# Patient Record
Sex: MALE | Race: WHITE | NOT HISPANIC OR LATINO | Employment: UNEMPLOYED | URBAN - METROPOLITAN AREA
[De-identification: names, ages, dates, MRNs, and addresses within clinical notes are randomized per-mention and may not be internally consistent; named-entity substitution may affect disease eponyms.]

---

## 2020-11-01 ENCOUNTER — TRANSFERRED RECORDS (OUTPATIENT)
Dept: HEALTH INFORMATION MANAGEMENT | Facility: CLINIC | Age: 60
End: 2020-11-01
Payer: COMMERCIAL

## 2020-11-19 ENCOUNTER — TRANSFERRED RECORDS (OUTPATIENT)
Dept: HEALTH INFORMATION MANAGEMENT | Facility: CLINIC | Age: 60
End: 2020-11-19
Payer: COMMERCIAL

## 2020-11-19 LAB
ALT SERPL-CCNC: 19 U/L (ref 9–46)
AST SERPL-CCNC: 18 U/L (ref 10–35)
CREATININE (EXTERNAL): 1.1 MG/DL (ref 0.7–1.25)
GFR ESTIMATED (EXTERNAL): 73 ML/MIN/1.73M2
GFR ESTIMATED (IF AFRICAN AMERICAN) (EXTERNAL): 84 ML/MIN/1.73M2
GLUCOSE (EXTERNAL): 124 MG/DL (ref 65–99)
POTASSIUM (EXTERNAL): 5.2 MMOL/L (ref 3.5–5.3)

## 2021-11-10 ENCOUNTER — OFFICE VISIT (OUTPATIENT)
Dept: FAMILY MEDICINE | Facility: CLINIC | Age: 61
End: 2021-11-10
Payer: COMMERCIAL

## 2021-11-10 VITALS
DIASTOLIC BLOOD PRESSURE: 93 MMHG | OXYGEN SATURATION: 97 % | HEART RATE: 87 BPM | BODY MASS INDEX: 38.73 KG/M2 | WEIGHT: 241 LBS | TEMPERATURE: 98.4 F | HEIGHT: 66 IN | RESPIRATION RATE: 20 BRPM | SYSTOLIC BLOOD PRESSURE: 158 MMHG

## 2021-11-10 DIAGNOSIS — Z72.0 TOBACCO ABUSE: ICD-10-CM

## 2021-11-10 DIAGNOSIS — I10 BENIGN ESSENTIAL HYPERTENSION: ICD-10-CM

## 2021-11-10 DIAGNOSIS — Z00.00 ENCOUNTER FOR MEDICAL EXAMINATION TO ESTABLISH CARE: Primary | ICD-10-CM

## 2021-11-10 DIAGNOSIS — E66.01 MORBID OBESITY (H): ICD-10-CM

## 2021-11-10 DIAGNOSIS — E78.00 HYPERCHOLESTEROLEMIA: ICD-10-CM

## 2021-11-10 DIAGNOSIS — E78.5 HYPERLIPIDEMIA LDL GOAL <100: ICD-10-CM

## 2021-11-10 DIAGNOSIS — F10.10 ALCOHOL ABUSE: ICD-10-CM

## 2021-11-10 LAB
ALBUMIN SERPL-MCNC: 3.6 G/DL (ref 3.5–5)
ALP SERPL-CCNC: 81 U/L (ref 45–120)
ALT SERPL W P-5'-P-CCNC: 32 U/L (ref 0–45)
ANION GAP SERPL CALCULATED.3IONS-SCNC: 12 MMOL/L (ref 5–18)
AST SERPL W P-5'-P-CCNC: 26 U/L (ref 0–40)
BILIRUB SERPL-MCNC: 0.8 MG/DL (ref 0–1)
BUN SERPL-MCNC: 12 MG/DL (ref 8–22)
CALCIUM SERPL-MCNC: 9.4 MG/DL (ref 8.5–10.5)
CHLORIDE BLD-SCNC: 104 MMOL/L (ref 98–107)
CHOLEST SERPL-MCNC: 172 MG/DL
CO2 SERPL-SCNC: 22 MMOL/L (ref 22–31)
CREAT SERPL-MCNC: 1.01 MG/DL (ref 0.7–1.3)
FASTING STATUS PATIENT QL REPORTED: ABNORMAL
GFR SERPL CREATININE-BSD FRML MDRD: 80 ML/MIN/1.73M2
GGT SERPL-CCNC: 67 U/L (ref 0–50)
GLUCOSE BLD-MCNC: 97 MG/DL (ref 70–125)
HDLC SERPL-MCNC: 63 MG/DL
LDLC SERPL CALC-MCNC: 79 MG/DL
POTASSIUM BLD-SCNC: 4.1 MMOL/L (ref 3.5–5)
PROT SERPL-MCNC: 7.4 G/DL (ref 6–8)
SODIUM SERPL-SCNC: 138 MMOL/L (ref 136–145)
TRIGL SERPL-MCNC: 150 MG/DL

## 2021-11-10 PROCEDURE — 80061 LIPID PANEL: CPT | Performed by: STUDENT IN AN ORGANIZED HEALTH CARE EDUCATION/TRAINING PROGRAM

## 2021-11-10 PROCEDURE — 82977 ASSAY OF GGT: CPT | Performed by: STUDENT IN AN ORGANIZED HEALTH CARE EDUCATION/TRAINING PROGRAM

## 2021-11-10 PROCEDURE — 36415 COLL VENOUS BLD VENIPUNCTURE: CPT | Performed by: STUDENT IN AN ORGANIZED HEALTH CARE EDUCATION/TRAINING PROGRAM

## 2021-11-10 PROCEDURE — 99204 OFFICE O/P NEW MOD 45 MIN: CPT | Mod: 25 | Performed by: STUDENT IN AN ORGANIZED HEALTH CARE EDUCATION/TRAINING PROGRAM

## 2021-11-10 PROCEDURE — 0004A COVID-19,PF,PFIZER (12+ YRS): CPT | Performed by: STUDENT IN AN ORGANIZED HEALTH CARE EDUCATION/TRAINING PROGRAM

## 2021-11-10 PROCEDURE — 91300 COVID-19,PF,PFIZER (12+ YRS): CPT | Performed by: STUDENT IN AN ORGANIZED HEALTH CARE EDUCATION/TRAINING PROGRAM

## 2021-11-10 PROCEDURE — 80053 COMPREHEN METABOLIC PANEL: CPT | Performed by: STUDENT IN AN ORGANIZED HEALTH CARE EDUCATION/TRAINING PROGRAM

## 2021-11-10 RX ORDER — LISINOPRIL 20 MG/1
20 TABLET ORAL DAILY
Qty: 90 TABLET | Refills: 0 | Status: SHIPPED | OUTPATIENT
Start: 2021-11-10 | End: 2021-11-10

## 2021-11-10 RX ORDER — ATORVASTATIN CALCIUM 10 MG/1
10 TABLET, FILM COATED ORAL DAILY
Qty: 90 TABLET | Refills: 3 | Status: SHIPPED | OUTPATIENT
Start: 2021-11-10 | End: 2022-11-17

## 2021-11-10 RX ORDER — LISINOPRIL 20 MG/1
20 TABLET ORAL DAILY
Qty: 90 TABLET | Refills: 3 | Status: SHIPPED | OUTPATIENT
Start: 2021-11-10 | End: 2022-11-17

## 2021-11-10 RX ORDER — ATORVASTATIN CALCIUM 10 MG/1
10 TABLET, FILM COATED ORAL DAILY
Qty: 90 TABLET | Refills: 0 | Status: SHIPPED | OUTPATIENT
Start: 2021-11-10 | End: 2021-11-10

## 2021-11-10 ASSESSMENT — MIFFLIN-ST. JEOR: SCORE: 1840.92

## 2021-11-10 NOTE — LETTER
November 12, 2021      Gregory PICKARD Gregory  [address]   [place] [address]        Dear ,    We are writing to inform you of your test results.    Enclosed are the labs we recently performed.  Your kidney and liver function appear relatively normal.  The GGT is a more sensitive test to see if there is any stress on the liver and that was positive, suggesting that alcohol consumption is causing some irritation to your liver and prolonged periods of this can lead to significant liver disease.  Your lipid levels are a little higher than we would like (we don't really pay attention to the triglycerides as you were not fasting and those can be a little elevated if you recently ate).  Your numbers are good and you expressed not wanting to adjust your statin at this time (though you could consider doing this in the future).       Please let us know if there is anything else we could do for you.  As we discussed, we would recommend things like a colonoscopy or CT scan of the lungs to rule out lung cancer.  If you change your mind and would like to schedule these please let us know.       Have a good day,   Estrada Navas MD on 11/12/2021       Resulted Orders   Comprehensive metabolic panel   Result Value Ref Range    Sodium 138 136 - 145 mmol/L    Potassium 4.1 3.5 - 5.0 mmol/L    Chloride 104 98 - 107 mmol/L    Carbon Dioxide (CO2) 22 22 - 31 mmol/L    Anion Gap 12 5 - 18 mmol/L    Urea Nitrogen 12 8 - 22 mg/dL    Creatinine 1.01 0.70 - 1.30 mg/dL    Calcium 9.4 8.5 - 10.5 mg/dL    Glucose 97 70 - 125 mg/dL    Alkaline Phosphatase 81 45 - 120 U/L    AST 26 0 - 40 U/L    ALT 32 0 - 45 U/L    Protein Total 7.4 6.0 - 8.0 g/dL    Albumin 3.6 3.5 - 5.0 g/dL    Bilirubin Total 0.8 0.0 - 1.0 mg/dL    GFR Estimate 80 >60 mL/min/1.73m2      Comment:      As of July 11, 2021, eGFR is calculated by the CKD-EPI creatinine equation, without race adjustment. eGFR can be influenced by muscle mass, exercise, and diet.  The reported eGFR is an estimation only and is only applicable if the renal function is stable.   Lipid Profile   Result Value Ref Range    Cholesterol 172 <=199 mg/dL    Triglycerides 150 (H) <=149 mg/dL    Direct Measure HDL 63 >=40 mg/dL      Comment:      HDL Cholesterol Reference Range:     0-2 years:   No reference ranges established for patients under 2 years old  at [facility] for lipid analytes.    2-8 years:  Greater than 45 mg/dL     18 years and older:   Female: Greater than or equal to 50 mg/dL   Male:   Greater than or equal to 40 mg/dL    LDL Cholesterol Calculated 79 <=129 mg/dL    Patient Fasting > 8hrs? Unknown    GGT   Result Value Ref Range    GGT 67 (H) 0 - 50 U/L       If you have any questions or concerns, please call the clinic at the number listed above.       Sincerely,      Estrada Navas MD

## 2021-11-10 NOTE — PROGRESS NOTES
CHIEF COMPLAINT                                                      Chief Complaint   Patient presents with     [facility] Care     [facility] prior to care here     Medication Reconciliation     Needs attention, brought medication list     SUBJECTIVE:                                                    Gregory Jenkins is a 60 year old year old male who presents to clinic today for the following health issues:    Establish care:  -Patient is switching to our clinic from [facility]  -Patient felt like he never saw the same provider there and that they took too long to be seen  -Today is looking for refills on his home medications as he is about to run out  -Reports a history of some alcohol abuse and continues to drink to the point of blacking out at least twice weekly   -States that he has been feeling at his baseline without any acute concerns today and he is not interested in adjusting medications   -Family history of prostate cancer, grandfather  from it but he denies any acute issues with this (increased urinary frequency, inability to empty bladder, struggles maintaining a stream, etc)  -Uses a pouch of snooze ten times a day, was using cigarettes prior to this for about forty years  -Twice weekly will drink 12-14 drinks at a time to the point of blacking out  -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    ----------------------------------------------------------------------------------------------------------------------  Patient Active Problem List   Diagnosis     Hypercholesterolemia     Morbid obesity (H)     Tobacco abuse     Alcohol abuse     Benign essential hypertension     Past Surgical History:   Procedure Laterality Date     TONSILLECTOMY         Social History     Tobacco Use     Smoking status: Not on file     Smokeless tobacco: Not on file   Substance Use Topics     Alcohol use: Yes     Comment: 2 times per week      "Family History   Problem Relation Age of Onset     Prostate Cancer Father           Problem list and past medical, surgical, social, and family histories reviewed & adjusted, as indicated.    Current Outpatient Medications   Medication Sig Dispense Refill     atorvastatin (LIPITOR) 10 MG tablet Take 1 tablet (10 mg) by mouth daily 90 tablet 3     lisinopril (ZESTRIL) 20 MG tablet Take 1 tablet (20 mg) by mouth daily 90 tablet 3     Medication list reviewed and updated as indicated.    No Known Allergies  Allergies reviewed and updated as indicated.  ----------------------------------------------------------------------------------------------------------------------  ROS:  Constitutional, HEENT, cardiovascular, pulmonary, GI, musculoskeletal, neuro, skin, and psych systems are negative, except as otherwise noted.    OBJECTIVE:     BP (!) 158/93   Pulse 87   Temp 98.4  F (36.9  C) (Oral)   Resp 20   Ht 1.676 m (5' 6\")   Wt 109.3 kg (241 lb)   SpO2 97%   BMI 38.90 kg/m    Body mass index is 38.9 kg/m .  Exam:  Constitutional: Elderly gentleman sitting upright in his chair, no acute discomfort noted, elevated BMI  Head: Normocephalic. No masses, lesions, tenderness or abnormalities  Neck: Neck supple. No adenopathy. Thyroid symmetric, normal size  ENT: ENT exam normal, no neck nodes or sinus tenderness  Cardiovascular: negative, PMI normal. No lifts, heaves, or thrills. RRR. No murmurs, clicks gallops or rub  Respiratory: negative, Percussion normal. Good diaphragmatic excursion. Lungs clear  Gastrointestinal: Abdomen soft, non-tender. BS normal. No masses, organomegaly  : Deferred  Musculoskeletal: extremities normal- no gross deformities noted, gait normal and normal muscle tone  Skin: no suspicious lesions or rashes  Neurologic: Gait normal. Reflexes normal and symmetric. Sensation grossly WNL.  Psychiatric: mentation appears normal and affect normal/bright  Hematologic/Lymphatic/Immunologic: Normal " cervical lymph nodes    Diagnostic Test Results:      ASSESSMENT/PLAN:   (Z00.00) Encounter for medical examination to establish care  (primary encounter diagnosis)  (I10) Benign essential hypertension  (E78.00) Hypercholesterolemia  (E78.5) Hyperlipidemia LDL goal <100  (F10.10) Alcohol abuse  (Z72.0) Tobacco abuse  (E66.01) Morbid obesity (H)  -Patient presents to the clinic to establish care  -Has a history of hypertension, currently on lisinopril 20 mg  -BP is not well controlled today but he does not want to change his medications at all at this time  -Will discuss at further visits   -Patient has a history of hyperlipidemia, is currently on atorvastatin 10 mg  -We will check his lipid profile today and will recheck kidney function in the setting of lisinopril use  -Will make BMP CMP given frequent alcohol use, will also check GTT to see if there are early signs of liver damage secondary to drinking alcohol  -Counseled on healthy habits  -Recommended lung cancer screening and colonoscopy, but the patient declines investigation at this time  -Recommended following up for annual exam at appropriate times  -Received COVID vaccine for today     Medications Discontinued During This Encounter   Medication Reason     atorvastatin (LIPITOR) 10 MG tablet      lisinopril (ZESTRIL) 20 MG tablet      Options for treatment and follow-up care were reviewed with the patient and/or guardian. Gregory Jenkins and/or guardian engaged in the decision making process and verbalized understanding of the options discussed and agreed with the final plan    Precepted with Dr. Ruiz.    Estrada Navas MD on 11/10/2021 at 3:13 PM

## 2021-11-10 NOTE — PROGRESS NOTES
Preceptor Attestation:   Patient seen, evaluated and discussed with the resident. I have verified the content of the note, which accurately reflects my assessment of the patient and the plan of care.  Supervising Physician:Estrada Ruiz MD  [facility]

## 2021-11-17 PROBLEM — I10 BENIGN ESSENTIAL HYPERTENSION: Status: ACTIVE | Noted: 2021-11-17

## 2021-11-17 PROBLEM — E78.00 HYPERCHOLESTEROLEMIA: Status: ACTIVE | Noted: 2021-11-17

## 2021-11-17 PROBLEM — F10.10 ALCOHOL ABUSE: Status: ACTIVE | Noted: 2021-11-17

## 2021-11-17 PROBLEM — Z72.0 TOBACCO ABUSE: Status: ACTIVE | Noted: 2021-11-17

## 2021-11-17 PROBLEM — E66.01 MORBID OBESITY (H): Status: ACTIVE | Noted: 2021-11-17

## 2022-10-12 ENCOUNTER — ALLIED HEALTH/NURSE VISIT (OUTPATIENT)
Dept: FAMILY MEDICINE | Facility: CLINIC | Age: 62
End: 2022-10-12
Payer: COMMERCIAL

## 2022-10-12 VITALS — DIASTOLIC BLOOD PRESSURE: 85 MMHG | HEART RATE: 96 BPM | SYSTOLIC BLOOD PRESSURE: 129 MMHG | OXYGEN SATURATION: 96 %

## 2022-10-12 DIAGNOSIS — Z23 ENCOUNTER FOR IMMUNIZATION: Primary | ICD-10-CM

## 2022-10-12 PROCEDURE — 99207 PR NO CHARGE NURSE ONLY: CPT

## 2022-10-12 PROCEDURE — 91312 COVID-19,PF,PFIZER BOOSTER BIVALENT: CPT

## 2022-10-12 PROCEDURE — 90682 RIV4 VACC RECOMBINANT DNA IM: CPT

## 2022-10-12 PROCEDURE — 90471 IMMUNIZATION ADMIN: CPT

## 2022-10-12 PROCEDURE — 0124A COVID-19,PF,PFIZER BOOSTER BIVALENT: CPT

## 2022-11-17 ENCOUNTER — OFFICE VISIT (OUTPATIENT)
Dept: FAMILY MEDICINE | Facility: CLINIC | Age: 62
End: 2022-11-17
Payer: COMMERCIAL

## 2022-11-17 VITALS
HEIGHT: 65 IN | TEMPERATURE: 98.7 F | OXYGEN SATURATION: 98 % | WEIGHT: 235 LBS | BODY MASS INDEX: 39.15 KG/M2 | SYSTOLIC BLOOD PRESSURE: 147 MMHG | RESPIRATION RATE: 20 BRPM | DIASTOLIC BLOOD PRESSURE: 89 MMHG

## 2022-11-17 DIAGNOSIS — E78.5 HYPERLIPIDEMIA LDL GOAL <100: ICD-10-CM

## 2022-11-17 DIAGNOSIS — Z72.0 TOBACCO USE: ICD-10-CM

## 2022-11-17 DIAGNOSIS — Z23 NEED FOR TDAP VACCINATION: ICD-10-CM

## 2022-11-17 DIAGNOSIS — I10 BENIGN ESSENTIAL HYPERTENSION: Primary | ICD-10-CM

## 2022-11-17 LAB
ANION GAP SERPL CALCULATED.3IONS-SCNC: 15 MMOL/L (ref 7–15)
BUN SERPL-MCNC: 9.1 MG/DL (ref 8–23)
CALCIUM SERPL-MCNC: 8.7 MG/DL (ref 8.8–10.2)
CHLORIDE SERPL-SCNC: 119 MMOL/L (ref 98–107)
CREAT SERPL-MCNC: 1.1 MG/DL (ref 0.67–1.17)
DEPRECATED HCO3 PLAS-SCNC: 19 MMOL/L (ref 22–29)
GFR SERPL CREATININE-BSD FRML MDRD: 76 ML/MIN/1.73M2
GLUCOSE SERPL-MCNC: 99 MG/DL (ref 70–99)
POTASSIUM SERPL-SCNC: 5 MMOL/L (ref 3.4–5.3)
SODIUM SERPL-SCNC: 153 MMOL/L (ref 136–145)

## 2022-11-17 PROCEDURE — 36415 COLL VENOUS BLD VENIPUNCTURE: CPT | Performed by: STUDENT IN AN ORGANIZED HEALTH CARE EDUCATION/TRAINING PROGRAM

## 2022-11-17 PROCEDURE — 80048 BASIC METABOLIC PNL TOTAL CA: CPT | Performed by: STUDENT IN AN ORGANIZED HEALTH CARE EDUCATION/TRAINING PROGRAM

## 2022-11-17 PROCEDURE — 90715 TDAP VACCINE 7 YRS/> IM: CPT | Performed by: STUDENT IN AN ORGANIZED HEALTH CARE EDUCATION/TRAINING PROGRAM

## 2022-11-17 PROCEDURE — 90471 IMMUNIZATION ADMIN: CPT | Performed by: STUDENT IN AN ORGANIZED HEALTH CARE EDUCATION/TRAINING PROGRAM

## 2022-11-17 PROCEDURE — 99214 OFFICE O/P EST MOD 30 MIN: CPT | Mod: 25 | Performed by: STUDENT IN AN ORGANIZED HEALTH CARE EDUCATION/TRAINING PROGRAM

## 2022-11-17 RX ORDER — BNT162B2 0.23 MG/2.25ML
INJECTION, SUSPENSION INTRAMUSCULAR
COMMUNITY
Start: 2022-05-01

## 2022-11-17 RX ORDER — ATORVASTATIN CALCIUM 10 MG/1
10 TABLET, FILM COATED ORAL DAILY
Qty: 90 TABLET | Refills: 3 | Status: SHIPPED | OUTPATIENT
Start: 2022-11-17 | End: 2023-11-13

## 2022-11-17 RX ORDER — LISINOPRIL 20 MG/1
20 TABLET ORAL DAILY
Qty: 90 TABLET | Refills: 3 | Status: SHIPPED | OUTPATIENT
Start: 2022-11-17 | End: 2023-11-13

## 2022-11-17 NOTE — PROGRESS NOTES
Preceptor Attestation:   Patient seen, evaluated and discussed with the resident. I have verified the content of the note, which accurately reflects my assessment of the patient and the plan of care.    Supervising Physician:Sandy Shelton MD    [facility]

## 2022-11-17 NOTE — PROGRESS NOTES
Assessment and Plan     (I10) Benign essential hypertension  (primary encounter diagnosis)  Comment: Initial /96 today. Not checking at home. Last visit a month ago had normal BP. Doesn't want to change BP med right now regardless. Will repeat BP before leaving clinic today.  Plan:   -BMP  -lisinopril (ZESTRIL) 20 MG tablet daily, refilled  -Recommended checking BP daily  -Recommended follow-up, patient will schedule on own he says    (E78.5) Hyperlipidemia LDL goal <100  Comment: Been on lipitor 10mg daily. Last lipid check showed LDL <90 and HDL >45. Doesn't want lipids rechecked today.  Plan:   -atorvastatin (LIPITOR) 10 MG tablet daily, refilled  -Recommended repeat lipids, patient declines    (Z23) Need for Tdap vaccination  Comment: No h/o tdap per [facility] and our records. Willing to get updated today.  Plan:   -TDAP VACCINE (Adacel, Boostrix)  [[id number]]    (Z72.0) Tobacco use  Comment: Using chewing tobacco, doesn't want to quit. Not smoking.  Plan:  -Counseled, patient doesn't want to quit        Options for treatment and follow-up care were reviewed with the patient and/or guardian. Gregory Jenkins and/or guardian engaged in the decision making process and verbalized understanding of the options discussed and agreed with the final plan.    Renny Tanner MD      Precepted today with: Dr Shelton           HPI:       Gregory Jenkins is a 61 year old  male with pertinent hx of HTN, chewing tobacco use, HLD who presents for medication refill:    On lisinopril and lipitor.  Not checking BP at home.  Needs refills.  Denies any complaints.  Not interested in changing meds right now.  Still using chewing tobacco.         PMHX:     Patient Active Problem List   Diagnosis     Hypercholesterolemia     Morbid obesity (H)     Tobacco abuse     Alcohol abuse     Benign essential hypertension       Current Outpatient Medications   Medication Sig Dispense Refill     atorvastatin (LIPITOR) 10 MG tablet Take 1 tablet (10 mg) by  "mouth daily 90 tablet 3     lisinopril (ZESTRIL) 20 MG tablet Take 1 tablet (20 mg) by mouth daily 90 tablet 3     PFIZER-BIONT COVID-19 VAC-EVANGELINA 30 MCG/0.3ML injection          Social History     Tobacco Use     Smoking status: Former     Packs/day: 1.00     Years: 40.00     Pack years: 40.00     Types: Cigarettes     Start date: 1/1/1972     Quit date: 1/1/2012     Years since quitting: 10.8     Smokeless tobacco: Current     Types: Snuff   Substance Use Topics     Alcohol use: Yes     Comment: 2 times per week     Drug use: Not Currently        No Known Allergies    No results found for this or any previous visit (from the past 24 hour(s)).         Review of Systems:     10 point ROS negative except for what is noted in HPI          Physical Exam:     Vitals:    11/17/22 0915   BP: (!) 152/96   Resp: 20   Temp: 98.7  F (37.1  C)   TempSrc: Oral   SpO2: 98%   Weight: 106.6 kg (235 lb)   Height: 1.64 m (5' 4.57\")     Body mass index is 39.63 kg/m .    General: Sitting comfortably. No acute distress.   HEENT: Conjunctivae are clear, nonicteric. EOMI.  Neck: No masses appreciated.  Respiratory: No respiratory distress.   Cardiac: Warm and well perfused. Brisk cap refill.  Abdominal: Abdomen is soft and non-tender without distention.  Extremities: Upper and lower extremities grossly normal.  Skin: Skin in warm without rashes.  Neurological: Motor function is grossly normal.   Psychiatric: Good insight.     "

## 2022-11-17 NOTE — LETTER
November 22, 2022      Gregory Jenkins  [address]   [place] [address]        Dear ,    We are writing to inform you of your test results.    The result of your recent labwork has returned. Your sodium (one of your electrolytes) was very high (153). This is unusual and warrants a repeat lab check. I'd recommend we recheck your labs; not sure exactly what this is due to in your case. I put in an order- recommend you come in whenever you can earliest.     Resulted Orders   Basic metabolic panel   Result Value Ref Range    Sodium 153 (H) 136 - 145 mmol/L    Potassium 5.0 3.4 - 5.3 mmol/L    Chloride 119 (H) 98 - 107 mmol/L    Carbon Dioxide (CO2) 19 (L) 22 - 29 mmol/L    Anion Gap 15 7 - 15 mmol/L    Urea Nitrogen 9.1 8.0 - 23.0 mg/dL    Creatinine 1.10 0.67 - 1.17 mg/dL    Calcium 8.7 (L) 8.8 - 10.2 mg/dL    Glucose 99 70 - 99 mg/dL    GFR Estimate 76 >60 mL/min/1.73m2      Comment:      Effective December 21, 2021 eGFRcr in adults is calculated using the 2021 CKD-EPI creatinine equation which includes age and gender (Joanne et al., NEJM, DOI: 10.1056/HEPDus1304160)       If you have any questions or concerns, please call the clinic at the number listed above.       Sincerely,      Renny Tanner MD

## 2022-11-21 ENCOUNTER — TELEPHONE (OUTPATIENT)
Dept: FAMILY MEDICINE | Facility: CLINIC | Age: 62
End: 2022-11-21

## 2022-11-21 DIAGNOSIS — E87.0 HYPERNATREMIA: Primary | ICD-10-CM

## 2022-11-23 ENCOUNTER — TELEPHONE (OUTPATIENT)
Dept: FAMILY MEDICINE | Facility: CLINIC | Age: 62
End: 2022-11-23

## 2022-11-23 NOTE — TELEPHONE ENCOUNTER
Pt called back today and would like to come in for blood work on Monday at 10:30 am.    Appt is scheduled.      June

## 2022-11-28 ENCOUNTER — LAB (OUTPATIENT)
Dept: LAB | Facility: CLINIC | Age: 62
End: 2022-11-28
Payer: COMMERCIAL

## 2022-11-28 DIAGNOSIS — E87.0 HYPERNATREMIA: ICD-10-CM

## 2022-11-28 LAB
ANION GAP SERPL CALCULATED.3IONS-SCNC: 14 MMOL/L (ref 7–15)
BUN SERPL-MCNC: 13.1 MG/DL (ref 8–23)
CALCIUM SERPL-MCNC: 8.7 MG/DL (ref 8.8–10.2)
CHLORIDE SERPL-SCNC: 108 MMOL/L (ref 98–107)
CREAT SERPL-MCNC: 1.03 MG/DL (ref 0.67–1.17)
DEPRECATED HCO3 PLAS-SCNC: 19 MMOL/L (ref 22–29)
GFR SERPL CREATININE-BSD FRML MDRD: 82 ML/MIN/1.73M2
GLUCOSE SERPL-MCNC: 94 MG/DL (ref 70–99)
POTASSIUM SERPL-SCNC: 4.3 MMOL/L (ref 3.4–5.3)
SODIUM SERPL-SCNC: 141 MMOL/L (ref 136–145)

## 2022-11-28 PROCEDURE — 80048 BASIC METABOLIC PNL TOTAL CA: CPT

## 2022-11-28 PROCEDURE — 36415 COLL VENOUS BLD VENIPUNCTURE: CPT

## 2023-11-13 ENCOUNTER — OFFICE VISIT (OUTPATIENT)
Dept: FAMILY MEDICINE | Facility: CLINIC | Age: 63
End: 2023-11-13
Payer: COMMERCIAL

## 2023-11-13 VITALS
SYSTOLIC BLOOD PRESSURE: 146 MMHG | HEIGHT: 66 IN | RESPIRATION RATE: 20 BRPM | DIASTOLIC BLOOD PRESSURE: 90 MMHG | HEART RATE: 95 BPM | TEMPERATURE: 98.3 F | BODY MASS INDEX: 39.21 KG/M2 | WEIGHT: 244 LBS | OXYGEN SATURATION: 95 %

## 2023-11-13 DIAGNOSIS — E78.2 MIXED HYPERLIPIDEMIA: ICD-10-CM

## 2023-11-13 DIAGNOSIS — Z12.11 COLON CANCER SCREENING: ICD-10-CM

## 2023-11-13 DIAGNOSIS — I10 BENIGN ESSENTIAL HYPERTENSION: Primary | ICD-10-CM

## 2023-11-13 LAB
ANION GAP SERPL CALCULATED.3IONS-SCNC: 12 MMOL/L (ref 7–15)
BUN SERPL-MCNC: 10.5 MG/DL (ref 8–23)
CALCIUM SERPL-MCNC: 9 MG/DL (ref 8.8–10.2)
CHLORIDE SERPL-SCNC: 106 MMOL/L (ref 98–107)
CHOLEST SERPL-MCNC: 174 MG/DL
CREAT SERPL-MCNC: 1.11 MG/DL (ref 0.67–1.17)
DEPRECATED HCO3 PLAS-SCNC: 21 MMOL/L (ref 22–29)
EGFRCR SERPLBLD CKD-EPI 2021: 75 ML/MIN/1.73M2
GLUCOSE SERPL-MCNC: 92 MG/DL (ref 70–99)
HDLC SERPL-MCNC: 49 MG/DL
LDLC SERPL CALC-MCNC: 80 MG/DL
NONHDLC SERPL-MCNC: 125 MG/DL
POTASSIUM SERPL-SCNC: 4.3 MMOL/L (ref 3.4–5.3)
SODIUM SERPL-SCNC: 139 MMOL/L (ref 135–145)
TRIGL SERPL-MCNC: 225 MG/DL

## 2023-11-13 PROCEDURE — 80061 LIPID PANEL: CPT

## 2023-11-13 PROCEDURE — 80048 BASIC METABOLIC PNL TOTAL CA: CPT

## 2023-11-13 PROCEDURE — 36415 COLL VENOUS BLD VENIPUNCTURE: CPT

## 2023-11-13 PROCEDURE — 99214 OFFICE O/P EST MOD 30 MIN: CPT | Mod: GC

## 2023-11-13 RX ORDER — LISINOPRIL 20 MG/1
20 TABLET ORAL DAILY
Qty: 90 TABLET | Refills: 3 | Status: SHIPPED | OUTPATIENT
Start: 2023-11-13 | End: 2024-08-07

## 2023-11-13 RX ORDER — ATORVASTATIN CALCIUM 10 MG/1
10 TABLET, FILM COATED ORAL DAILY
Qty: 90 TABLET | Refills: 3 | Status: SHIPPED | OUTPATIENT
Start: 2023-11-13 | End: 2024-02-05

## 2023-11-13 NOTE — PROGRESS NOTES
Assessment & Plan     Benign essential hypertension  Patient presents for hypertension follow-up. Blood pressure is slightly above goal today in clinic, similar to last year when he presented for a check. Current medications include: lisinopril 20 mg. Spent time discussing healthy lifestyle recommendations such as regular exercise most days of the week and balanced diet. Discussed regular monitoring of blood pressure at home, which Gregory agreed to do, and a blood pressure cuff was given to him. Discussed that he will check his blood pressure a couple of times a week regularly, and will let us know if the majority are greater than 140/90, at which time we would increase his lisinopril to 30 mg or 40 mg (patient wants lowest medication dose possible). Agrees to BMP check today   - lisinopril (ZESTRIL) 20 MG tablet; Take 1 tablet (20 mg) by mouth daily  - Basic metabolic panel    Mixed hyperlipidemia  Continues to tolerate current dose of atorvastatin, will refill. Agrees to cholesterol recheck today.   - atorvastatin (LIPITOR) 10 MG tablet; Take 1 tablet (10 mg) by mouth daily  - Lipid Profile    Colon cancer screening  Patient declines colon cancer screening at this time.     Winsome Vargas MD  [facility] Subjective   Gregory is a 62 year old, presenting for the following health issues:  Refill Request (For lisinopril and atorvastain)    HPI     Hypertension  Hyperlipidemia  - Presenting today for medication refills  - Currently on lisinopril 20 mg and atorvastatin 10 mg   - Blood pressure at home:   - Does not check his blood pressure at home, does not have a cuff.    - Used to check at [facility], but has not checked for many months    - Denies symptoms of chest pain, shortness of breath, lower extremity edema  - BMP check last year, cholesterol checked two years ago.   - Avoid salt intake   - Tries to stay hydrated.     Review of Systems   Constitutional, HEENT, cardiovascular,  "pulmonary, gi and gu systems are negative, except as otherwise noted.      Objective    BP (!) 146/90   Pulse 95   Temp 98.3  F (36.8  C)   Resp 20   Ht 1.676 m (5' 6\")   Wt 110.7 kg (244 lb)   SpO2 95%   BMI 39.38 kg/m    Body mass index is 39.38 kg/m .  Physical Exam     GENERAL: healthy, alert and no distress  EYES: Eyes grossly normal to inspection, PERRL and conjunctivae and sclerae normal  RESP: lungs clear to auscultation - no rales, rhonchi or wheezes  CV: regular rate and rhythm, normal S1 S2, no S3 or S4, no murmur  MS: no gross musculoskeletal defects noted, no edema  PSYCH: mentation appears normal, affect normal/bright                "

## 2023-11-13 NOTE — PROGRESS NOTES
Preceptor Attestation:  Patient's case reviewed and discussed with the resident, Winsome Vragas MD, and I personally evaluated the patient. I agree with written assessment and plan of care.    Supervising Physician:  Sayda Lux MD   [facility]

## 2023-11-13 NOTE — LETTER
November 21, 2023      Gregory PICKARD Gregory  [address]   [place] [address]        Dear ,    We are writing to inform you of your test results.    Samra Jenkins,     Thanks for coming to clinic to check in on your cholesterol and hypertension. We checked a couple of labs while you were here.     1. Your electrolytes and kidney function are normal.     2. Your cholesterol levels are about the same as they were two years ago. Please continue to take your cholesterol medicine.     Remember to please let us know if you are having elevated blood pressure checks at home with your monitor.     Thanks,     Dr. Vargas    Resulted Orders   Basic metabolic panel   Result Value Ref Range    Sodium 139 135 - 145 mmol/L      Comment:      Reference intervals for this test were updated on 09/26/2023 to more accurately reflect our healthy population. There may be differences in the flagging of prior results with similar values performed with this method. Interpretation of those prior results can be made in the context of the updated reference intervals.     Potassium 4.3 3.4 - 5.3 mmol/L    Chloride 106 98 - 107 mmol/L    Carbon Dioxide (CO2) 21 (L) 22 - 29 mmol/L    Anion Gap 12 7 - 15 mmol/L    Urea Nitrogen 10.5 8.0 - 23.0 mg/dL    Creatinine 1.11 0.67 - 1.17 mg/dL    GFR Estimate 75 >60 mL/min/1.73m2    Calcium 9.0 8.8 - 10.2 mg/dL    Glucose 92 70 - 99 mg/dL   Lipid Profile   Result Value Ref Range    Cholesterol 174 <200 mg/dL    Triglycerides 225 (H) <150 mg/dL    Direct Measure HDL 49 >=40 mg/dL    LDL Cholesterol Calculated 80 <=100 mg/dL    Non HDL Cholesterol 125 <130 mg/dL    Narrative    Cholesterol  Desirable:  <200 mg/dL    Triglycerides  Normal:  Less than 150 mg/dL  Borderline High:  150-199 mg/dL  High:  200-499 mg/dL  Very High:  Greater than or equal to 500 mg/dL    Direct Measure HDL  Female:  Greater than or equal to 50 mg/dL   Male:  Greater than or equal to 40 mg/dL    LDL Cholesterol  Desirable:   <100mg/dL  Above Desirable:  100-129 mg/dL   Borderline High:  130-159 mg/dL   High:  160-189 mg/dL   Very High:  >= 190 mg/dL    Non HDL Cholesterol  Desirable:  130 mg/dL  Above Desirable:  130-159 mg/dL  Borderline High:  160-189 mg/dL  High:  190-219 mg/dL  Very High:  Greater than or equal to 220 mg/dL       If you have any questions or concerns, please call the clinic at the number listed above.       Sincerely,      Sayda Lux MD

## 2024-01-31 ENCOUNTER — TELEPHONE (OUTPATIENT)
Dept: FAMILY MEDICINE | Facility: CLINIC | Age: 64
End: 2024-01-31
Payer: COMMERCIAL

## 2024-01-31 DIAGNOSIS — E78.2 MIXED HYPERLIPIDEMIA: ICD-10-CM

## 2024-01-31 DIAGNOSIS — I10 BENIGN ESSENTIAL HYPERTENSION: Primary | ICD-10-CM

## 2024-01-31 NOTE — TELEPHONE ENCOUNTER
[facility] phone call message- medication clarification/question:    Full Medication Name: lisinopril (ZESTRIL) 20 MG tablet         atorvastatin (LIPITOR) 10 MG tablet               Question: Caller is calling ok to up dosage atorvastatin (LIPITOR) 10 MG tablet  to 20mg.  And lisinopril (ZESTRIL) 20 MG tablet to up to 40mg. Please call and advise, thank you.     [id number] - [place], [place] - [address] AT [facility]: Yes    OK to leave a message on voice mail? Yes    Primary language: English      needed? No    Call taken on January 31, 2024 at 12:59 PM by Jorge Mcmahan

## 2024-02-05 RX ORDER — LISINOPRIL 20 MG/1
40 TABLET ORAL DAILY
Qty: 60 TABLET | Refills: 1 | Status: SHIPPED | OUTPATIENT
Start: 2024-02-05 | End: 2024-04-17

## 2024-02-05 RX ORDER — ATORVASTATIN CALCIUM 20 MG/1
20 TABLET, FILM COATED ORAL DAILY
Qty: 90 TABLET | Refills: 3 | Status: SHIPPED | OUTPATIENT
Start: 2024-02-05 | End: 2024-10-02

## 2024-02-05 NOTE — TELEPHONE ENCOUNTER
Had discussed with the patient at last appointment that if his blood pressure continues to be elevated, we would prescribe an increased dose of lisinopril to 40 mg. Gregory called the clinic to notify us of continued high blood pressure, so I will send in the prescription. Will have patient follow-up in one month to recheck blood pressure.     Winsome Vargas MD  [facility], PGY-3

## 2024-04-09 ENCOUNTER — TELEPHONE (OUTPATIENT)
Dept: FAMILY MEDICINE | Facility: CLINIC | Age: 64
End: 2024-04-09
Payer: COMMERCIAL

## 2024-04-09 NOTE — TELEPHONE ENCOUNTER
[facility] phone call message- medication clarification/question:    Full Medication Name: lisinopril (ZESTRIL) 40 MG tablet     Question: Patient is requesting more refills, he mentioned he would like 1 30's and 2 90's. Pt stated that should be enough for him until November.    Pharmacy confirmed as [id number] #[id number] - [place], [place] - [address] [place] [address] AT [facility]: Yes    OK to leave a message on voice mail? Yes    Primary language: English      needed? No    Call taken on April 9, 2024 at 10:55 AM by Jaclyn Romero

## 2024-04-17 DIAGNOSIS — I10 BENIGN ESSENTIAL HYPERTENSION: ICD-10-CM

## 2024-04-17 RX ORDER — LISINOPRIL 20 MG/1
40 TABLET ORAL DAILY
Qty: 90 TABLET | Refills: 1 | Status: SHIPPED | OUTPATIENT
Start: 2024-04-17 | End: 2024-09-27

## 2024-04-17 NOTE — TELEPHONE ENCOUNTER
[facility] phone call message- medication clarification/question:    Full Medication Name: lisinopril (ZESTRIL) 20 MG tablet        Question: patient called in stating he needs a new prescription sent for the above medication.  He says he wants one 30 day fill and 2 x 90 day fills.? Said this multiple times during conversation -[facility]       Pharmacy confirmed as [facility] #[id number] - [place], [place] - [address] [facility] AT [address] [facility]: Yes    OK to leave a message on voice mail? Yes    Primary language: English      needed? No    Call taken on April 17, 2024 at 9:16 AM by Lula Kitchen

## 2024-08-07 DIAGNOSIS — I10 BENIGN ESSENTIAL HYPERTENSION: ICD-10-CM

## 2024-08-07 RX ORDER — LISINOPRIL 20 MG/1
20 TABLET ORAL DAILY
Qty: 90 TABLET | Refills: 1 | Status: SHIPPED | OUTPATIENT
Start: 2024-08-07 | End: 2024-09-25

## 2024-09-25 DIAGNOSIS — I10 BENIGN ESSENTIAL HYPERTENSION: ICD-10-CM

## 2024-09-25 RX ORDER — LISINOPRIL 20 MG/1
20 TABLET ORAL DAILY
Qty: 30 TABLET | Refills: 0 | Status: SHIPPED | OUTPATIENT
Start: 2024-09-25

## 2024-09-27 DIAGNOSIS — I10 BENIGN ESSENTIAL HYPERTENSION: ICD-10-CM

## 2024-09-27 NOTE — TELEPHONE ENCOUNTER
Patient states that the prescription sent on 9/26 was not correct.. He wants     lisinopril (ZESTRIL) 20 MG tablet - Disp   90 tablet     Sig - Route: Take 2 tablets (40 mg) by mouth daily - Oral     Please resend over prescription - Patient is scheduled for appt on 10/16. Call and advise if needed. Thank you.

## 2024-09-30 RX ORDER — LISINOPRIL 20 MG/1
40 TABLET ORAL DAILY
Qty: 90 TABLET | Refills: 0 | Status: SHIPPED | OUTPATIENT
Start: 2024-09-30 | End: 2024-10-02

## 2024-09-30 NOTE — TELEPHONE ENCOUNTER
Writer called pharmacy to discuss. Advised of dosage change, pharmacist verbalized understanding with read back of new dosage. Overrode in system for therapy override, prescription went through. No further action from writer needed at this time. Wandy ARMANDO

## 2024-09-30 NOTE — TELEPHONE ENCOUNTER
Patient states that the insurance won't pay/cover it until the Oct 13th. Please someone call and clarify with pharmacy to see what they need..Per patient, please expedite this.

## 2024-10-02 ENCOUNTER — OFFICE VISIT (OUTPATIENT)
Dept: FAMILY MEDICINE | Facility: CLINIC | Age: 64
End: 2024-10-02
Payer: COMMERCIAL

## 2024-10-02 VITALS
HEIGHT: 66 IN | SYSTOLIC BLOOD PRESSURE: 122 MMHG | HEART RATE: 94 BPM | OXYGEN SATURATION: 93 % | RESPIRATION RATE: 22 BRPM | DIASTOLIC BLOOD PRESSURE: 89 MMHG | TEMPERATURE: 97 F | WEIGHT: 240 LBS | BODY MASS INDEX: 38.57 KG/M2

## 2024-10-02 DIAGNOSIS — E78.2 MIXED HYPERLIPIDEMIA: ICD-10-CM

## 2024-10-02 DIAGNOSIS — I10 BENIGN ESSENTIAL HYPERTENSION: Primary | ICD-10-CM

## 2024-10-02 PROCEDURE — 99214 OFFICE O/P EST MOD 30 MIN: CPT | Mod: GC

## 2024-10-02 RX ORDER — LISINOPRIL 20 MG/1
40 TABLET ORAL DAILY
Qty: 180 TABLET | Refills: 3 | Status: SHIPPED | OUTPATIENT
Start: 2024-10-02

## 2024-10-02 RX ORDER — ATORVASTATIN CALCIUM 20 MG/1
20 TABLET, FILM COATED ORAL DAILY
Qty: 90 TABLET | Refills: 3 | Status: SHIPPED | OUTPATIENT
Start: 2024-10-02

## 2024-10-02 NOTE — PROGRESS NOTES
"  Assessment & Plan     Gregory is a 63 year old here for medication refills as below.  Declines labs and other preventative care.    Benign essential hypertension  - BP at goal today, Refilled lisinopril 20mg  - encouraged 6 month follow-up for labs    Mixed hyperlipidemia  - Lipids 6M ago OK, refilled statin    BMI  Estimated body mass index is 38.74 kg/m  as calculated from the following:    Height as of this encounter: 1.676 m (5' 6\").    Weight as of this encounter: 108.9 kg (240 lb).     Work on weight loss  Regular exercise    Subjective   Gregory is a 63 year old, presenting for the following health issues:  RECHECK (BP and get refills)    Just wants Rx renewal          10/2/2024    10:49 AM   Additional Questions   Roomed by Marli   Accompanied by SRINIVASAN         10/2/2024    Information    services provided? No            Objective    /89   Pulse 94   Temp 97  F (36.1  C)   Resp 22   Ht 1.676 m (5' 6\")   Wt 108.9 kg (240 lb)   SpO2 93%   BMI 38.74 kg/m    Body mass index is 38.74 kg/m .  Physical Exam     GENERAL: healthy, alert and no distress  RESP: lungs clear, speaking in full sentences, normal work of breathing   CV: RRR, extremities well perfused  ABDOMEN: soft, nontender  MS: no gross musculoskeletal defects noted, no edema  PSYCH: mentation appears normal, affect normal/bright           Signed Electronically by: Hema Garcia MD    "

## 2024-10-03 NOTE — PROGRESS NOTES
Preceptor Attestation:  Patient's case reviewed and discussed with Hema Garcia MD resident and I evaluated the patient. I agree with written assessment and plan of care.  Supervising Physician:  eTo Levy MD, MD [facility]  [facility]